# Patient Record
Sex: FEMALE | Race: WHITE | Employment: OTHER | URBAN - METROPOLITAN AREA
[De-identification: names, ages, dates, MRNs, and addresses within clinical notes are randomized per-mention and may not be internally consistent; named-entity substitution may affect disease eponyms.]

---

## 2017-09-07 ENCOUNTER — OFFICE VISIT (OUTPATIENT)
Dept: OBGYN CLINIC | Facility: CLINIC | Age: 77
End: 2017-09-07

## 2017-09-07 VITALS
BODY MASS INDEX: 25.58 KG/M2 | WEIGHT: 163 LBS | SYSTOLIC BLOOD PRESSURE: 118 MMHG | DIASTOLIC BLOOD PRESSURE: 76 MMHG | HEIGHT: 67 IN

## 2017-09-07 DIAGNOSIS — R92.2 DENSE BREAST TISSUE: Primary | ICD-10-CM

## 2017-09-07 PROBLEM — R92.30 DENSE BREAST TISSUE: Status: ACTIVE | Noted: 2017-09-07

## 2017-09-07 PROCEDURE — 99213 OFFICE O/P EST LOW 20 MIN: CPT | Performed by: OBSTETRICS & GYNECOLOGY

## 2017-09-07 NOTE — PROGRESS NOTES
Nenita Cisneros is a 78year old female. HPI:   Patient presents with:  Breast Lump: left breast, noticed it about a week ago      Here with concerns about thickened breast tissue beneath left breast that feels less prominent today.  No skin changes or a

## 2017-10-10 ENCOUNTER — OFFICE VISIT (OUTPATIENT)
Dept: OBGYN CLINIC | Facility: CLINIC | Age: 77
End: 2017-10-10

## 2017-10-10 VITALS
SYSTOLIC BLOOD PRESSURE: 116 MMHG | HEIGHT: 67 IN | DIASTOLIC BLOOD PRESSURE: 60 MMHG | WEIGHT: 163 LBS | BODY MASS INDEX: 25.58 KG/M2

## 2017-10-10 DIAGNOSIS — R92.2 DENSE BREAST TISSUE: Primary | ICD-10-CM

## 2017-10-10 PROCEDURE — 99213 OFFICE O/P EST LOW 20 MIN: CPT | Performed by: OBSTETRICS & GYNECOLOGY

## 2017-10-10 NOTE — PROGRESS NOTES
Silvina Cruz is a 78year old female. HPI:   Patient presents with:  Breast Lump: follow up breast exam      Here for follow up left breast exam. No longer feels prior lump.     Medications (Active prior to today's visit):    Current Outpatient Prescr

## 2020-03-05 ENCOUNTER — OFFICE VISIT (OUTPATIENT)
Dept: OBGYN CLINIC | Facility: CLINIC | Age: 80
End: 2020-03-05
Payer: MEDICARE

## 2020-03-05 VITALS
DIASTOLIC BLOOD PRESSURE: 74 MMHG | HEIGHT: 67 IN | BODY MASS INDEX: 25.9 KG/M2 | WEIGHT: 165 LBS | SYSTOLIC BLOOD PRESSURE: 126 MMHG

## 2020-03-05 DIAGNOSIS — R10.2 VAGINAL PAIN: Primary | ICD-10-CM

## 2020-03-05 PROCEDURE — 87529 HSV DNA AMP PROBE: CPT | Performed by: OBSTETRICS & GYNECOLOGY

## 2020-03-05 PROCEDURE — 99213 OFFICE O/P EST LOW 20 MIN: CPT | Performed by: OBSTETRICS & GYNECOLOGY

## 2020-03-05 NOTE — PROGRESS NOTES
Gita Sharma is a 68year old female. HPI:   Patient presents with: Follow - Up: EST/VAGINAL SORENESS    Noted tender red spot on inner left labia a few weeks ago with irritative symptoms. Unresponsive to OTC HC cream and A&D.  No bleeding or drainag

## 2020-03-06 LAB
HSV1 DNA SPEC QL NAA+PROBE: POSITIVE
HSV2 DNA SPEC QL NAA+PROBE: NEGATIVE

## 2020-03-10 ENCOUNTER — PATIENT MESSAGE (OUTPATIENT)
Dept: OBGYN CLINIC | Facility: CLINIC | Age: 80
End: 2020-03-10

## 2020-03-10 ENCOUNTER — TELEPHONE (OUTPATIENT)
Dept: OBGYN CLINIC | Facility: CLINIC | Age: 80
End: 2020-03-10

## 2020-03-10 NOTE — TELEPHONE ENCOUNTER
Unable to leave message to discuss test results. Called both home and cell numbers with no answer and no VM option. Will repeat attempts at phone calls. Hanna Stiles

## 2020-03-10 NOTE — TELEPHONE ENCOUNTER
Notes recorded by Bria Yañez MD on 3/10/2020 at 1:58 PM CDT  Please call my office to discuss your test results. I called both your home and cell phone numbers today with no answer and no voicemail option at either number.       Jacey Rivero MD

## 2020-03-10 NOTE — PROGRESS NOTES
Jordan Loja MD    3/10/20 1:57 PM   Note    Unable to leave message to discuss test results. Called both home and cell numbers with no answer and no VM option. Will repeat attempts at phone calls.

## 2020-03-11 ENCOUNTER — TELEPHONE (OUTPATIENT)
Dept: OBGYN CLINIC | Facility: CLINIC | Age: 80
End: 2020-03-11

## 2020-03-11 RX ORDER — VALACYCLOVIR HYDROCHLORIDE 1 G/1
1 TABLET, FILM COATED ORAL DAILY
Qty: 5 TABLET | Refills: 5 | Status: SHIPPED | OUTPATIENT
Start: 2020-03-11 | End: 2020-03-16

## 2020-03-11 NOTE — PROGRESS NOTES
Spoke with patient and reviewed HSV1 culture result and need for Rx. Will send Valtrex eRx now.  Ce Winters MD   3/11/20 1:50 PM

## 2020-04-08 ENCOUNTER — OFFICE VISIT (OUTPATIENT)
Dept: OBGYN CLINIC | Facility: CLINIC | Age: 80
End: 2020-04-08
Payer: MEDICARE

## 2020-04-08 VITALS
BODY MASS INDEX: 25.9 KG/M2 | SYSTOLIC BLOOD PRESSURE: 118 MMHG | WEIGHT: 165 LBS | DIASTOLIC BLOOD PRESSURE: 64 MMHG | HEIGHT: 67 IN

## 2020-04-08 DIAGNOSIS — A60.00 GENITAL HERPES SIMPLEX TYPE 1 INFECTION: Primary | ICD-10-CM

## 2020-04-08 PROCEDURE — 99213 OFFICE O/P EST LOW 20 MIN: CPT | Performed by: OBSTETRICS & GYNECOLOGY

## 2020-04-08 RX ORDER — VALACYCLOVIR HYDROCHLORIDE 1 G/1
TABLET, FILM COATED ORAL
COMMUNITY

## 2020-04-08 RX ORDER — TACROLIMUS 1 MG/G
OINTMENT TOPICAL
COMMUNITY

## 2020-04-08 RX ORDER — CIPROFLOXACIN 500 MG/1
TABLET, FILM COATED ORAL
COMMUNITY

## 2020-04-08 NOTE — PROGRESS NOTES
Marcha Gilford is a 68year old female. HPI:   Patient presents with: Follow - Up: on vaginal irritation     Feeling much better with resolution of vulvar irritation after completing Valtrex prescribed for HSV1 positive culture on 3/5/2020.     Michael Espinal

## 2021-02-05 ENCOUNTER — OFFICE VISIT (OUTPATIENT)
Dept: OTOLARYNGOLOGY | Facility: CLINIC | Age: 81
End: 2021-02-05
Payer: MEDICARE

## 2021-02-05 VITALS
BODY MASS INDEX: 25.9 KG/M2 | DIASTOLIC BLOOD PRESSURE: 72 MMHG | TEMPERATURE: 98 F | SYSTOLIC BLOOD PRESSURE: 128 MMHG | HEIGHT: 67 IN | WEIGHT: 165 LBS

## 2021-02-05 DIAGNOSIS — R49.0 HOARSENESS: ICD-10-CM

## 2021-02-05 DIAGNOSIS — J34.2 DEVIATED NASAL SEPTUM: Primary | ICD-10-CM

## 2021-02-05 PROCEDURE — 99203 OFFICE O/P NEW LOW 30 MIN: CPT | Performed by: OTOLARYNGOLOGY

## 2021-02-05 PROCEDURE — 31575 DIAGNOSTIC LARYNGOSCOPY: CPT | Performed by: OTOLARYNGOLOGY

## 2021-02-05 RX ORDER — FLUTICASONE PROPIONATE 50 MCG
1 SPRAY, SUSPENSION (ML) NASAL 2 TIMES DAILY
Qty: 1 BOTTLE | Refills: 3 | Status: SHIPPED | OUTPATIENT
Start: 2021-02-05

## 2021-02-05 RX ORDER — MONTELUKAST SODIUM 10 MG/1
10 TABLET ORAL NIGHTLY
Qty: 30 TABLET | Refills: 3 | Status: SHIPPED | OUTPATIENT
Start: 2021-02-05 | End: 2021-05-04

## 2021-02-05 RX ORDER — LORATADINE 10 MG/1
10 TABLET ORAL DAILY
Qty: 30 TABLET | Refills: 3 | Status: SHIPPED | OUTPATIENT
Start: 2021-02-05 | End: 2021-05-04

## 2021-02-05 NOTE — PROGRESS NOTES
Jagdish Gabriel is a 80year old female.   Patient presents with:  Throat Problem: Patient Presents with: Hoarseness for 5 weeks, sore throat, phlegm , chokes when drinking liquids       HISTORY OF PRESENT ILLNESS  She presents with a 5-week history of hoar 1971    X 3    • KNEE SURGERY Right 1983   • OTHER SURGICAL HISTORY  06/18/2008    Endometrial BX   • OTHER SURGICAL HISTORY  01/28/2009    vulva bx   • WRIST FRACTURE SURGERY Right          REVIEW OF SYSTEMS    System Neg/Pos Details   Constitutional Kiera Harris cervical. Supraclavicular. Nose/Mouth/Throat Normal External nose - Normal. Lips/teeth/gums - Normal. Tonsils - Normal. Oropharynx -postnasal discharge with erythema.    Nose/Mouth/Throat Normal Nares - Right: Normal Left: Normal. Septum -deviated to skin., Disp: , Rfl:   •  Multiple Vitamins-Minerals (MULTIVITAMIN ADULT OR), Take by mouth., Disp: , Rfl:   •  Pseudoephedrine HCl (SUDAFED OR), Take by mouth as needed. , Disp: , Rfl:   •  Levothyroxine Sodium 150 MCG Oral Tab, TK 1 T PO QD, Disp: , Rfl: 3

## 2021-03-05 ENCOUNTER — OFFICE VISIT (OUTPATIENT)
Dept: OTOLARYNGOLOGY | Facility: CLINIC | Age: 81
End: 2021-03-05
Payer: MEDICARE

## 2021-03-05 VITALS
WEIGHT: 165 LBS | TEMPERATURE: 96 F | BODY MASS INDEX: 25.9 KG/M2 | SYSTOLIC BLOOD PRESSURE: 138 MMHG | DIASTOLIC BLOOD PRESSURE: 82 MMHG | HEIGHT: 67 IN

## 2021-03-05 DIAGNOSIS — R49.0 HOARSENESS: Primary | ICD-10-CM

## 2021-03-05 DIAGNOSIS — J34.2 DEVIATED NASAL SEPTUM: ICD-10-CM

## 2021-03-05 PROCEDURE — 99213 OFFICE O/P EST LOW 20 MIN: CPT | Performed by: OTOLARYNGOLOGY

## 2021-03-05 RX ORDER — GLYCOPYRROLATE 1 MG/1
1 TABLET ORAL 3 TIMES DAILY
Qty: 90 TABLET | Refills: 1 | Status: SHIPPED | OUTPATIENT
Start: 2021-03-05

## 2021-03-05 NOTE — PROGRESS NOTES
Cristhian Blanchard is a 80year old female. Patient presents with:   Follow - Up: Patient here for 1 month follow up regarding Hoarseness, per pt feeling better       HISTORY OF PRESENT ILLNESS  She presents with a 5-week history of hoarseness that comes and ABUSE       Family History   Problem Relation Age of Onset   • Heart Attack Father    • Diabetes Father    • Other (PARKINSON) Mother        Past Medical History:   Diagnosis Date   • Breast cancer ([facility] [address] 2004   • Genital herpes simplex 03/05/2020    HSV1 c Cranial nerves II through XII grossly intact.    Head/Face Normal Facial features - Normal. Eyebrows - Normal. Skull - Normal.        Nasopharynx Normal External nose - Normal. Lips/teeth/gums - Normal. Tonsils - Normal. Oropharynx - Normal.   Ears Normal I Rfl:   •  valACYclovir HCl 1 G Oral Tab, valacyclovir 1 gram tablet, Disp: , Rfl:   •  tacrolimus 0.1 % External Ointment, tacrolimus 0.1 % topical ointment, Disp: , Rfl:   ASSESSMENT AND PLAN    1. Hoarseness    2.  Deviated nasal septum  Voice issues are

## 2021-03-20 ENCOUNTER — PATIENT MESSAGE (OUTPATIENT)
Dept: OTOLARYNGOLOGY | Facility: CLINIC | Age: 81
End: 2021-03-20

## 2021-03-22 NOTE — TELEPHONE ENCOUNTER
From: Karina Rosa  To: Sabas Angelo. Nicol eD Paz MD  Sent: 3/20/2021 12:14 PM CDT  Subject: Non-Urgent Medical Question    with the addition of the glycopyrrolate to my medicine regime, it seems to dry up and harden the mucous in my nose and throat.  Waking up th

## 2021-05-04 RX ORDER — MONTELUKAST SODIUM 10 MG/1
TABLET ORAL
Qty: 30 TABLET | Refills: 3 | Status: SHIPPED | OUTPATIENT
Start: 2021-05-04

## 2021-05-04 RX ORDER — LORATADINE 10 MG/1
TABLET ORAL
Qty: 90 TABLET | Refills: 0 | Status: SHIPPED | OUTPATIENT
Start: 2021-05-04

## 2021-05-07 ENCOUNTER — OFFICE VISIT (OUTPATIENT)
Dept: OTOLARYNGOLOGY | Facility: CLINIC | Age: 81
End: 2021-05-07
Payer: MEDICARE

## 2021-05-07 VITALS
BODY MASS INDEX: 25.9 KG/M2 | WEIGHT: 165 LBS | TEMPERATURE: 99 F | SYSTOLIC BLOOD PRESSURE: 124 MMHG | DIASTOLIC BLOOD PRESSURE: 80 MMHG | HEIGHT: 67 IN

## 2021-05-07 DIAGNOSIS — R49.0 HOARSENESS: Primary | ICD-10-CM

## 2021-05-07 DIAGNOSIS — J34.2 DEVIATED NASAL SEPTUM: ICD-10-CM

## 2021-05-07 PROCEDURE — 31575 DIAGNOSTIC LARYNGOSCOPY: CPT | Performed by: OTOLARYNGOLOGY

## 2021-05-07 PROCEDURE — 99213 OFFICE O/P EST LOW 20 MIN: CPT | Performed by: OTOLARYNGOLOGY

## 2021-05-07 NOTE — PROGRESS NOTES
Gina Millard is a 80year old female. No chief complaint on file.       HISTORY OF PRESENT ILLNESS  She presents with a 5-week history of hoarseness that comes and goes. Freeman she actually feels pretty good.  She does have a more recent sore throat at back to 2 tablets a day.     Social History    Socioeconomic History      Marital status:       Spouse name: Not on file      Number of children: Not on file      Years of education: Not on file      Highest education level: Not on file    Tobacco Us Normal.   Psychiatric Normal Orientation - Oriented to time, place, person & situation. Appropriate mood and affect.    Neck Exam Normal Inspection - Normal. Palpation - Normal. Parotid gland - Normal. Thyroid gland - Normal.   Eyes Normal Conjunctiva - Rig HCl (SUDAFED OR), Take by mouth as needed. , Disp: , Rfl:   •  Levothyroxine Sodium 150 MCG Oral Tab, TK 1 T PO QD, Disp: , Rfl: 3  •  levETIRAcetam 500 MG Oral Tab, , Disp: , Rfl:   •  Glucosamine-Chondroit-Vit C-Mn (GLUCOSAMINE 1500 COMPLEX OR), Take by m

## 2021-06-06 ENCOUNTER — HOSPITAL ENCOUNTER (OUTPATIENT)
Age: 81
Discharge: HOME | End: 2021-06-06
Payer: MEDICARE

## 2021-06-06 VITALS
OXYGEN SATURATION: 99 % | SYSTOLIC BLOOD PRESSURE: 123 MMHG | DIASTOLIC BLOOD PRESSURE: 76 MMHG | HEART RATE: 81 BPM | RESPIRATION RATE: 18 BRPM | WEIGHT: 165 LBS | HEIGHT: 67 IN | TEMPERATURE: 97 F | BODY MASS INDEX: 25.9 KG/M2

## 2021-06-06 DIAGNOSIS — N30.00 ACUTE CYSTITIS WITHOUT HEMATURIA: Primary | ICD-10-CM

## 2021-06-06 DIAGNOSIS — R35.0 URINARY FREQUENCY: ICD-10-CM

## 2021-06-06 PROCEDURE — 99203 OFFICE O/P NEW LOW 30 MIN: CPT | Performed by: EMERGENCY MEDICINE

## 2021-06-06 PROCEDURE — 81002 URINALYSIS NONAUTO W/O SCOPE: CPT | Performed by: EMERGENCY MEDICINE

## 2021-06-06 RX ORDER — CEPHALEXIN 500 MG/1
500 CAPSULE ORAL 2 TIMES DAILY
Qty: 14 CAPSULE | Refills: 0 | Status: SHIPPED | OUTPATIENT
Start: 2021-06-06 | End: 2021-06-13

## 2021-06-06 NOTE — ED PROVIDER NOTES
Patient Seen in: [facility]      History   Patient presents with:  Urinary Symptoms    Stated Complaint: Urinary issue    HPI/Subjective:   Kamari Monroy is a 80year old female presents for c/o Uti sx for 5 days. Pain right now is 0/10.  C °F (36.1 °C) (Tympanic)   Resp 18   Ht 170.2 cm (5' 7\")   Wt 74.8 kg   SpO2 99%   BMI 25.84 kg/m²         Physical Exam  Vitals and nursing note reviewed. Constitutional:       Appearance: Normal appearance. She is not ill-appearing.    HENT:      Head: 01/21/2019 0.7  no hx of bonita, or ckd. MDM     Urine dip discussed with pt; leuks and blood. Culture sent as discussed. Pain control. Start abx. We discussed hsv1 flare, renal stones vs pyelonephritis vs cystitis. No fever chills body aches or back pain.

## 2021-11-17 ENCOUNTER — HOSPITAL ENCOUNTER (OUTPATIENT)
Age: 81
Discharge: HOME | End: 2021-11-17
Payer: MEDICARE

## 2021-11-17 VITALS
BODY MASS INDEX: 26.68 KG/M2 | OXYGEN SATURATION: 100 % | SYSTOLIC BLOOD PRESSURE: 130 MMHG | WEIGHT: 170 LBS | RESPIRATION RATE: 16 BRPM | DIASTOLIC BLOOD PRESSURE: 94 MMHG | HEART RATE: 80 BPM | HEIGHT: 67 IN | TEMPERATURE: 98 F

## 2021-11-17 DIAGNOSIS — N30.90 CYSTITIS: Primary | ICD-10-CM

## 2021-11-17 PROCEDURE — 99213 OFFICE O/P EST LOW 20 MIN: CPT | Performed by: NURSE PRACTITIONER

## 2021-11-17 PROCEDURE — 81002 URINALYSIS NONAUTO W/O SCOPE: CPT | Performed by: NURSE PRACTITIONER

## 2021-11-17 RX ORDER — CEPHALEXIN 500 MG/1
500 CAPSULE ORAL 2 TIMES DAILY
Qty: 14 CAPSULE | Refills: 0 | Status: SHIPPED | OUTPATIENT
Start: 2021-11-17 | End: 2021-11-24

## 2021-11-17 NOTE — ED PROVIDER NOTES
Patient presents with:  Urinary Symptoms      HPI:     Oksana Reaves is a 80year old female who presents with a chief complaint of foul-smelling urine. This has been going on for the past couple days.   The patient states when this occurs, she usually h Narrative      NO HISTORY OF ABUSE     Social Determinants of Health  Financial Resource Strain: Not on file  Food Insecurity: Not on file  Transportation Needs: Not on file  Physical Activity: Not on file  Stress: Not on file  Social Connections: Not on f Negative mg/dL    Bilirubin, Urine Negative Negative    Blood, Urine Trace-lysed (A) Negative    Nitrite Urine Negative Negative    Urobilinogen urine <2.0 <2.0 mg/dL    Leukocyte esterase urine Large (A) Negative       MDM:   The urine dip shows large lior

## 2021-11-19 ENCOUNTER — HOSPITAL ENCOUNTER (OUTPATIENT)
Age: 81
Discharge: HOME | End: 2021-11-19
Payer: MEDICARE

## 2021-11-19 VITALS
DIASTOLIC BLOOD PRESSURE: 82 MMHG | HEART RATE: 83 BPM | OXYGEN SATURATION: 98 % | TEMPERATURE: 98 F | RESPIRATION RATE: 16 BRPM | SYSTOLIC BLOOD PRESSURE: 136 MMHG

## 2021-11-19 DIAGNOSIS — N30.90 CYSTITIS: Primary | ICD-10-CM

## 2021-11-19 PROCEDURE — 99213 OFFICE O/P EST LOW 20 MIN: CPT | Performed by: NURSE PRACTITIONER

## 2021-11-19 RX ORDER — CIPROFLOXACIN 500 MG/1
500 TABLET, FILM COATED ORAL 2 TIMES DAILY
Qty: 14 TABLET | Refills: 0 | Status: SHIPPED | OUTPATIENT
Start: 2021-11-19 | End: 2021-11-26

## 2021-11-19 NOTE — ED PROVIDER NOTES
Patient Seen in: [facility]      History   Patient presents with:  Urinary Symptoms    Stated Complaint: Urinary symptoms    Subjective:   HPI    This is a 17-year-old female presenting with urinary symptoms.   Patient states that she was her Nose: Nose normal.      Mouth/Throat:      Mouth: Mucous membranes are moist.      Pharynx: Oropharynx is clear. Eyes:      Conjunctiva/sclera: Conjunctivae normal.   Cardiovascular:      Rate and Rhythm: Normal rate.       Heart sounds: Normal heart charbel worsening symptoms or if she feels her symptoms have not improved. Patient made aware she will receive a phone call with urine culture results if by chance the urine culture showed some type of resistance to Cipro. Discussed close follow-up with her PCP.

## 2021-11-19 NOTE — ED NOTES
Pt aware of results came in today as patient, reports continues with discomfort and wants antibiotic changed.

## 2023-01-24 ENCOUNTER — HOSPITAL ENCOUNTER (OUTPATIENT)
Age: 83
Discharge: HOME | End: 2023-01-24
Payer: MEDICARE

## 2023-01-24 VITALS
DIASTOLIC BLOOD PRESSURE: 76 MMHG | HEART RATE: 68 BPM | TEMPERATURE: 98 F | RESPIRATION RATE: 17 BRPM | OXYGEN SATURATION: 100 % | SYSTOLIC BLOOD PRESSURE: 176 MMHG

## 2023-01-24 DIAGNOSIS — S91.311A LACERATION OF RIGHT FOOT, INITIAL ENCOUNTER: Primary | ICD-10-CM

## 2023-01-24 PROCEDURE — 99213 OFFICE O/P EST LOW 20 MIN: CPT | Performed by: EMERGENCY MEDICINE

## 2023-01-24 RX ORDER — CEPHALEXIN 500 MG/1
500 CAPSULE ORAL 2 TIMES DAILY
Qty: 14 CAPSULE | Refills: 0 | Status: SHIPPED | OUTPATIENT
Start: 2023-01-24 | End: 2023-01-31

## 2023-01-24 NOTE — DISCHARGE INSTRUCTIONS
Go through all of your paper work for treatment, and when to go to the ER. It is not appropriate to follow up for wound check in the urgent care. If you feel you need a follow up, call your primary and let them know you were seen at the urgent care today to see if/when they need to see you. If you do not have a primary provider, go to [email or web address] org to find one  Wounds take 10-14 days to heal. Some may take longer. Wound care:  Wash hand before and after dressing changes. Change dressing every 12 hours starting tomorrow unless visibly soiled. Do not shower with the dressing. This can be taken off and placed back on. Do not pull at the dressing if it is stuck. Apply a wet warm (not hot) compress to the area to help soak the dressing off. Pulling can cause bleeding and trauma to the skin. 1. Xeroform = Yellow antibiotic gauze. This can be found on [facility]. Other name: 3% bismuth-tribromphenate in a petrolatum blend  2. Telfa = white guaze. Nonadherent. This is the inside of a Band-Aid. 3. Coban = Brown self adhesive wrap. Does not stick to skin. Always wrap toward you heart. For example: if you wrap your leg start toward the foot and wrap up toward the ankle. Watch for streaking. Take Tylenol as needed for pain if there is no contraindication. Go to the ER for new or worsening symptoms as described on you After Visit Summary (AVS).

## 2023-01-24 NOTE — ED INITIAL ASSESSMENT (HPI)
Pt came in due to laceration on the right foot. Pt stated a week ago she was coming out of the shower and she scraped her right foot. Pt is UTD with tetanus vaccine. Pt is not actively bleeding. Pt has easy non labored respirations.

## 2023-07-20 ENCOUNTER — HOSPITAL ENCOUNTER (OUTPATIENT)
Age: 83
Discharge: HOME | End: 2023-07-20
Payer: MEDICARE

## 2023-07-20 VITALS
TEMPERATURE: 97 F | SYSTOLIC BLOOD PRESSURE: 167 MMHG | RESPIRATION RATE: 18 BRPM | OXYGEN SATURATION: 96 % | HEART RATE: 72 BPM | DIASTOLIC BLOOD PRESSURE: 86 MMHG

## 2023-07-20 DIAGNOSIS — S00.06XA TICK BITE OF SCALP, INITIAL ENCOUNTER: Primary | ICD-10-CM
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
## 2023-07-20 PROCEDURE — 99213 OFFICE O/P EST LOW 20 MIN: CPT | Performed by: NURSE PRACTITIONER

## 2023-07-20 RX ORDER — DOXYCYCLINE HYCLATE 100 MG/1
200 CAPSULE ORAL ONCE
Qty: 2 CAPSULE | Refills: 0 | Status: SHIPPED | OUTPATIENT
Start: 2023-07-20 | End: 2023-07-20

## 2023-07-20 NOTE — ED INITIAL ASSESSMENT (HPI)
Pt hiking on Sunday and found a tick on her scalp today; pt brought tick with her for confirmation; denies current symptoms

## 2023-10-14 ENCOUNTER — HOSPITAL ENCOUNTER (OUTPATIENT)
Age: 83
Discharge: HOME | End: 2023-10-14
Payer: MEDICARE

## 2023-10-14 VITALS
DIASTOLIC BLOOD PRESSURE: 70 MMHG | HEART RATE: 77 BPM | TEMPERATURE: 99 F | OXYGEN SATURATION: 100 % | RESPIRATION RATE: 20 BRPM | SYSTOLIC BLOOD PRESSURE: 161 MMHG

## 2023-10-14 DIAGNOSIS — J06.9 VIRAL UPPER RESPIRATORY TRACT INFECTION WITH COUGH: ICD-10-CM

## 2023-10-14 DIAGNOSIS — J02.9 ACUTE VIRAL PHARYNGITIS: Primary | ICD-10-CM

## 2023-10-14 DIAGNOSIS — J30.89 ENVIRONMENTAL AND SEASONAL ALLERGIES: ICD-10-CM

## 2023-10-14 LAB — S PYO AG THROAT QL: NEGATIVE

## 2023-10-14 RX ORDER — DEXAMETHASONE 4 MG/1
8 TABLET ORAL ONCE
Status: COMPLETED | OUTPATIENT
Start: 2023-10-14 | End: 2023-10-14

## 2023-10-14 RX ORDER — BENZOCAINE/MENTH/CETYLPYRD CL 15 MG-2 MG
1 LOZENGE MUCOUS MEMBRANE EVERY 6 HOURS PRN
Qty: 20 LOZENGE | Refills: 0 | Status: SHIPPED | OUTPATIENT
Start: 2023-10-14

## 2023-10-14 RX ORDER — ROSUVASTATIN CALCIUM 40 MG/1
40 TABLET, COATED ORAL DAILY
COMMUNITY

## 2023-10-14 NOTE — ED INITIAL ASSESSMENT (HPI)
Pt here with complaints of sore throat , cough and congestion that began 1 week ago, pt denies any fevers or sob, pt states she has been testing negative for Covid at home

## 2023-12-03 ENCOUNTER — HOSPITAL ENCOUNTER (OUTPATIENT)
Age: 83
Discharge: HOME | End: 2023-12-03
Payer: MEDICARE

## 2023-12-03 VITALS
TEMPERATURE: 97 F | OXYGEN SATURATION: 99 % | RESPIRATION RATE: 18 BRPM | SYSTOLIC BLOOD PRESSURE: 144 MMHG | HEART RATE: 76 BPM | DIASTOLIC BLOOD PRESSURE: 75 MMHG

## 2023-12-03 DIAGNOSIS — N30.00 ACUTE CYSTITIS WITHOUT HEMATURIA: Primary | ICD-10-CM

## 2023-12-03 LAB
BILIRUB UR QL STRIP: NEGATIVE
COLOR UR: YELLOW
GLUCOSE UR STRIP-MCNC: NEGATIVE MG/DL
KETONES UR STRIP-MCNC: NEGATIVE MG/DL
NITRITE UR QL STRIP: POSITIVE
PH UR STRIP: 6 [PH]
PROT UR STRIP-MCNC: NEGATIVE MG/DL
SP GR UR STRIP: 1.01
UROBILINOGEN UR STRIP-ACNC: <2 MG/DL

## 2023-12-03 PROCEDURE — 81002 URINALYSIS NONAUTO W/O SCOPE: CPT | Performed by: NURSE PRACTITIONER

## 2023-12-03 PROCEDURE — 99213 OFFICE O/P EST LOW 20 MIN: CPT | Performed by: NURSE PRACTITIONER

## 2023-12-03 RX ORDER — CEPHALEXIN 500 MG/1
500 CAPSULE ORAL EVERY 12 HOURS
Qty: 14 CAPSULE | Refills: 0 | Status: SHIPPED | OUTPATIENT
Start: 2023-12-03 | End: 2023-12-10

## 2024-08-07 ENCOUNTER — HOSPITAL ENCOUNTER (OUTPATIENT)
Age: 84
Discharge: ANOTHER HOSPITAL | End: 2024-08-07
Payer: MEDICARE

## 2024-08-07 VITALS
RESPIRATION RATE: 20 BRPM | TEMPERATURE: 98 F | SYSTOLIC BLOOD PRESSURE: 131 MMHG | HEART RATE: 90 BPM | DIASTOLIC BLOOD PRESSURE: 80 MMHG | OXYGEN SATURATION: 100 %

## 2024-08-07 DIAGNOSIS — I48.91 ATRIAL FIBRILLATION WITH RAPID VENTRICULAR RESPONSE (HCC): Primary | ICD-10-CM

## 2024-08-07 LAB
Q-T INTERVAL: 326 MS
QRS DURATION: 90 MS
QTC CALCULATION (BEZET): 481 MS
R AXIS: -16 DEGREES
T AXIS: 123 DEGREES
VENTRICULAR RATE: 131 BPM

## 2024-08-07 PROCEDURE — 99215 OFFICE O/P EST HI 40 MIN: CPT | Performed by: NURSE PRACTITIONER

## 2024-08-07 PROCEDURE — 93000 ELECTROCARDIOGRAM COMPLETE: CPT | Performed by: NURSE PRACTITIONER

## 2024-08-07 RX ORDER — LOSARTAN POTASSIUM 100 MG/1
100 TABLET ORAL DAILY
COMMUNITY
Start: 2024-07-23

## 2024-08-07 RX ORDER — ACETAMINOPHEN 500 MG
500 TABLET ORAL
COMMUNITY
Start: 2024-04-24

## 2024-08-07 RX ORDER — LEVOTHYROXINE SODIUM 0.12 MG/1
TABLET ORAL
COMMUNITY
Start: 2024-07-03

## 2024-08-07 RX ORDER — ATORVASTATIN CALCIUM 80 MG/1
80 TABLET, FILM COATED ORAL NIGHTLY
COMMUNITY
Start: 2024-05-20

## 2024-08-07 RX ORDER — METOPROLOL SUCCINATE 50 MG/1
50 TABLET, EXTENDED RELEASE ORAL DAILY
COMMUNITY
Start: 2024-07-23

## 2024-08-07 NOTE — ED QUICK NOTES
EMS called at 0911 for transport to [facility] ED for afib with RVR.  EMS arrived 916.

## 2024-08-07 NOTE — ED PROVIDER NOTES
Patient Seen in: [facility]      History     Chief Complaint   Patient presents with    Lightheadedness     Stated Complaint: HIGH BLOOD PRESSURE    Subjective:   HPI  Patient is an 84-year-old female with high cholesterol and seizure disorder.  She presents to immediate care center today with concern for lightheaded, dizziness, weakness.  This started this morning.  She walked from her residence to her doctor's office.  They were without electricity and deferred her here (she walked between buildings).  She remains lightheaded and dizzy at time of evaluation.  She has had no chest pain or shortness of air.  She denies recent illness or injury.      She has a history of atrial fibrillation and was admitted to the hospital  to  with A-fib and rapid ventricular rate.  During that hospitalization she was found to have 90% occlusion of the LAD.  PCI was performed.  Postprocedure she had confusion.  MRI showed multiple foci of ischemia.         Objective:   Past Medical History:    Breast cancer (HCC)    Genital herpes simplex    HSV1 culture positive    Hypothyroidism    Seizure disorder (HCC)              Past Surgical History:   Procedure Laterality Date    Ankle fracture surgery Left 2016    Breast lumpectomy Left       1965 1969 1971    X 3     Knee surgery Right 1983    Other surgical history  2008    Endometrial BX    Other surgical history  2009    vulva bx    Wrist fracture surgery Right                 Social History     Socioeconomic History    Marital status:    Tobacco Use    Smoking status: Never    Smokeless tobacco: Never   Substance and Sexual Activity    Alcohol use: Yes     Alcohol/week: 0.0 standard drinks of alcohol     Comment: OCC WINE    Drug use: No    Sexual activity: Yes   Other Topics Concern    Blood Transfusions Yes     Comment: 1965    Social History Narrative    NO HISTORY OF ABUSE      Social Determinants of Health      Financial Resource Strain: Low Risk  (5/23/2024)    Received from [facility]    Overall Financial Resource Strain (CARDIA)     Difficulty of Paying Living Expenses: Not hard at all   Food Insecurity: No Food Insecurity (5/23/2024)    Received from [facility]    Hunger Vital Sign     Worried About Running Out of Food in the Last Year: Never true     Ran Out of Food in the Last Year: Never true   Transportation Needs: No Transportation Needs (5/23/2024)    Received from [facility]    PRAPARE - Transportation     Lack of Transportation (Medical): No     Lack of Transportation (Non-Medical): No   Housing Stability: Low Risk  (5/23/2024)    Received from [facility]    Housing Stability Vital Sign     Unable to Pay for Housing in the Last Year: No     Number of Places Lived in the Last Year: 1     In the last 12 months, was there a time when you did not have a steady place to sleep or slept in a shelter (including now)?: No              Review of Systems   Constitutional:  Negative for fever.   HENT:  Negative for congestion.    Respiratory:  Negative for cough and shortness of breath.    Cardiovascular:  Negative for chest pain.   Gastrointestinal:  Negative for abdominal pain.   Neurological:  Positive for dizziness, weakness and light-headedness. Negative for headaches.       Positive for stated Chief Complaint: Lightheadedness    Other systems are as noted in HPI.  Constitutional and vital signs reviewed.      All other systems reviewed and negative except as noted above.    Physical Exam     ED Triage Vitals [08/07/24 0848]   /80   Pulse 90   Resp 20   Temp 97.6 °F (36.4 °C)   Temp src Temporal   SpO2 100 %   O2 Device        Current Vitals:   Vital Signs  BP: 131/80  Pulse: 90  Resp: 20  Temp: 97.6 °F (36.4 °C)  Temp src: Temporal    Oxygen Therapy  SpO2: 100 %            Physical Exam  Vitals and nursing note reviewed.    Constitutional:       General: She is not in acute distress.     Appearance: She is ill-appearing.   HENT:      Head: Normocephalic and atraumatic.   Cardiovascular:      Rate and Rhythm: Tachycardia present. Rhythm irregular.   Pulmonary:      Effort: Pulmonary effort is normal. No respiratory distress.   Skin:     General: Skin is warm and dry.   Neurological:      Mental Status: She is alert and oriented to person, place, and time.   Psychiatric:         Behavior: Behavior normal.               ED Course   Labs Reviewed - No data to display  EKG    Rate, intervals and axes as noted on EKG Report.  Rate: 131  Rhythm: Atrial Fibrillation  Reading: septal infarct, age undetermined; NO STEMI                          MDM      Patient was found to be in atrial fibrillation with rapid ventricular response promptly after presentation here.  We had immediate conversation that she will need resources for evaluation and treatment unavailable in the immediate care center.  She was informed that it is imperative that she go directly from here to the emergency department for further evaluation.  Patient agreed.  As patient is symptomatic with her A-fib with RVR, EMS transportation was recommended.  Initially, patient declined.  Patient's  was brought to the bedside.  We had conversation about patient's current condition and risk for deterioration as her intention was to walk home from here (several blocks) and then be driven by private vehicle to the emergency department.   is uncomfortable with this.  They had a conversation.  Patient agrees to EMS transportation.      EMS arrived and transported patient to the emergency department in stable condition.                                 Medical Decision Making  Differential diagnoses considered today include, but are not exclusive of: Atrial fibrillation; acute coronary syndrome; acute cerebrovascular accident; TIA    Problems Addressed:  Atrial fibrillation with  rapid ventricular response (HCC): undiagnosed new problem with uncertain prognosis    Amount and/or Complexity of Data Reviewed  ECG/medicine tests: independent interpretation performed. Decision-making details documented in ED Course.        Disposition and Plan     Clinical Impression:  1. Atrial fibrillation with rapid ventricular response (HCC)         Disposition:  Ic to ed  8/7/2024  9:12 am    Follow-up:  No follow-up provider specified.        Medications Prescribed:  Current Discharge Medication List

## 2024-12-09 ENCOUNTER — HOSPITAL ENCOUNTER (OUTPATIENT)
Age: 84
Discharge: HOME | End: 2024-12-09
Payer: MEDICARE

## 2024-12-09 VITALS
TEMPERATURE: 98 F | OXYGEN SATURATION: 97 % | RESPIRATION RATE: 18 BRPM | SYSTOLIC BLOOD PRESSURE: 150 MMHG | DIASTOLIC BLOOD PRESSURE: 81 MMHG | HEART RATE: 96 BPM

## 2024-12-09 DIAGNOSIS — N30.00 ACUTE CYSTITIS WITHOUT HEMATURIA: Primary | ICD-10-CM

## 2024-12-09 LAB
BILIRUB UR QL STRIP: NEGATIVE
COLOR UR: YELLOW
GLUCOSE UR STRIP-MCNC: NEGATIVE MG/DL
KETONES UR STRIP-MCNC: NEGATIVE MG/DL
NITRITE UR QL STRIP: POSITIVE
PH UR STRIP: 7 [PH]
SP GR UR STRIP: 1.01
UROBILINOGEN UR STRIP-ACNC: <2 MG/DL

## 2024-12-09 PROCEDURE — 81002 URINALYSIS NONAUTO W/O SCOPE: CPT | Performed by: EMERGENCY MEDICINE

## 2024-12-09 PROCEDURE — 99214 OFFICE O/P EST MOD 30 MIN: CPT | Performed by: EMERGENCY MEDICINE

## 2024-12-09 RX ORDER — CEPHALEXIN 500 MG/1
500 CAPSULE ORAL 2 TIMES DAILY
Qty: 14 CAPSULE | Refills: 0 | Status: SHIPPED | OUTPATIENT
Start: 2024-12-09 | End: 2024-12-16

## 2024-12-09 NOTE — ED PROVIDER NOTES
Patient Seen in: [facility]      History     Chief Complaint   Patient presents with    Urinary Symptoms     Stated Complaint: BLADDER INFECTION    Subjective:   HPI    Teresita Porter is a 84 year old female here for uti sx for one day. No fever, back pain. Recent GI illness last week. Sx are better.       Objective:     Past Medical History:    Breast cancer (HCC)    Genital herpes simplex    HSV1 culture positive    Hypothyroidism    Seizure disorder (HCC)              Past Surgical History:   Procedure Laterality Date    Ankle fracture surgery Left 2016    Breast lumpectomy Left       1965 1969 1971    X 3     Knee surgery Right 1983    Other surgical history  2008    Endometrial BX    Other surgical history  2009    vulva bx    Wrist fracture surgery Right                 Social History     Socioeconomic History    Marital status:    Tobacco Use    Smoking status: Never    Smokeless tobacco: Never   Substance and Sexual Activity    Alcohol use: Yes     Alcohol/week: 0.0 standard drinks of alcohol     Comment: OCC WINE    Drug use: No    Sexual activity: Yes   Other Topics Concern    Blood Transfusions Yes     Comment: 1965    Social History Narrative    NO HISTORY OF ABUSE      Social Drivers of Health     Financial Resource Strain: Low Risk  (2024)    Received from [facility]    Overall Financial Resource Strain (CARDIA)     Difficulty of Paying Living Expenses: Not hard at all   Food Insecurity: No Food Insecurity (2024)    Received from [facility]    Hunger Vital Sign     Worried About Running Out of Food in the Last Year: Never true     Ran Out of Food in the Last Year: Never true   Transportation Needs: No Transportation Needs (2024)    Received from [facility]    PRAPARE - Transportation     Lack of Transportation (Medical): No     Lack of Transportation  (Non-Medical): No   Housing Stability: Low Risk  (5/23/2024)    Received from [facility]    Housing Stability Vital Sign     Unable to Pay for Housing in the Last Year: No     Number of Places Lived in the Last Year: 1     Unstable Housing in the Last Year: No              Review of Systems    Positive for stated complaint: BLADDER INFECTION  Other systems are as noted in HPI.  Constitutional and vital signs reviewed.      All other systems reviewed and negative except as noted above.    Physical Exam     ED Triage Vitals [12/09/24 0937]   /81   Pulse 96   Resp 18   Temp 98 °F (36.7 °C)   Temp src Oral   SpO2 97 %   O2 Device None (Room air)       Current Vitals:   Vital Signs  BP: 150/81 (checked twice)  Pulse: 96  Resp: 18  Temp: 98 °F (36.7 °C)  Temp src: Oral    Oxygen Therapy  SpO2: 97 %  O2 Device: None (Room air)        Physical Exam  Vitals and nursing note reviewed.   Constitutional:       Appearance: Normal appearance. She is not ill-appearing.   HENT:      Head: Normocephalic.   Eyes:      Conjunctiva/sclera: Conjunctivae normal.      Pupils: Pupils are equal, round, and reactive to light.   Cardiovascular:      Rate and Rhythm: Normal rate.      Pulses: Normal pulses.   Pulmonary:      Effort: Pulmonary effort is normal. No respiratory distress.      Breath sounds: Normal breath sounds.   Abdominal:      General: Abdomen is flat. There is no distension.      Palpations: Abdomen is soft.      Tenderness: There is no abdominal tenderness. There is no right CVA tenderness, left CVA tenderness or guarding.   Skin:     Findings: No erythema or rash.   Neurological:      General: No focal deficit present.      Mental Status: She is alert and oriented to person, place, and time.   Psychiatric:         Mood and Affect: Mood normal.         Behavior: Behavior normal.         Thought Content: Thought content normal.         Judgment: Judgment normal.             ED Course     Labs  Reviewed   [facility] POCT URINALYSIS DIPSTICK - Abnormal; Notable for the following components:       Result Value    Urine Clarity Cloudy (*)     Protein urine Trace (*)     Blood, Urine Small (*)     Nitrite Urine Positive (*)     Leukocyte esterase urine Large (*)     All other components within normal limits   URINE CULTURE, ROUTINE                   MDM           Medical Decision Making  cystitis vs OAB vs renal stone vs sti vs metabolic vs somatic.    Empiric tx for UTI. Abx sent to pharmacy to take as directed.  Patient is aware that antibiotic would not treat symptoms it will treat the bacterial infection.    Urine dip discussed with pt; we will call with culture results. OTC Pain control as needed. No fever chills body aches or back pain. No N/V/D.     All questions answered and reinforced ER precautions.  Primary care follow-up as needed.  No acute distress and cleared for discharge          Problems Addressed:  Acute cystitis without hematuria: acute illness or injury    Amount and/or Complexity of Data Reviewed  External Data Reviewed: notes.  Labs: ordered. Decision-making details documented in ED Course.     Details: Independant interpretation, and reviewed with patient. No recent urine culture.     Risk  OTC drugs.  Prescription drug management.        Disposition and Plan     Clinical Impression:  1. Acute cystitis without hematuria         Disposition:  Discharge  12/9/2024 10:12 am    Follow-up:  Nicol Melo MD  [address] [place] [id number] [phone]                Medications Prescribed:  Discharge Medication List as of 12/9/2024 10:16 AM        START taking these medications    Details   cephALEXin (KEFLEX) 500 MG Oral Cap Take 1 capsule (500 mg total) by mouth 2 (two) times daily for 7 days., Normal, Disp-14 capsule, [id number]. Collaborating MD Romana Mckay.                 Supplementary Documentation:

## 2024-12-09 NOTE — ED INITIAL ASSESSMENT (HPI)
Pt states having symptoms yesterday, pt states having some increase in urgency and frequency and urine having an odor.

## 2024-12-26 ENCOUNTER — HOSPITAL ENCOUNTER (OUTPATIENT)
Age: 84
Discharge: HOME | End: 2024-12-26
Payer: MEDICARE

## 2024-12-26 VITALS
TEMPERATURE: 98 F | RESPIRATION RATE: 16 BRPM | SYSTOLIC BLOOD PRESSURE: 133 MMHG | HEART RATE: 71 BPM | OXYGEN SATURATION: 98 % | DIASTOLIC BLOOD PRESSURE: 68 MMHG

## 2024-12-26 DIAGNOSIS — N39.0 ACUTE URINARY TRACT INFECTION: Primary | ICD-10-CM

## 2024-12-26 LAB
BILIRUB UR QL STRIP: NEGATIVE
COLOR UR: YELLOW
GLUCOSE UR STRIP-MCNC: NEGATIVE MG/DL
KETONES UR STRIP-MCNC: NEGATIVE MG/DL
NITRITE UR QL STRIP: POSITIVE
PH UR STRIP: 7 [PH]
PROT UR STRIP-MCNC: 100 MG/DL
SP GR UR STRIP: 1.02
UROBILINOGEN UR STRIP-ACNC: <2 MG/DL

## 2024-12-26 PROCEDURE — 99214 OFFICE O/P EST MOD 30 MIN: CPT | Performed by: PHYSICIAN ASSISTANT

## 2024-12-26 PROCEDURE — 81002 URINALYSIS NONAUTO W/O SCOPE: CPT | Performed by: PHYSICIAN ASSISTANT

## 2024-12-26 RX ORDER — CEFADROXIL 500 MG/1
500 CAPSULE ORAL 2 TIMES DAILY
Qty: 14 CAPSULE | Refills: 0 | Status: SHIPPED | OUTPATIENT
Start: 2024-12-26 | End: 2025-01-02

## 2024-12-26 NOTE — ED PROVIDER NOTES
Chief Complaint   Patient presents with    Urinary Symptoms       History obtained from: patient   services not used     HPI:     Teresita Porter is a 84 year old female who presents with UTI symptoms x 2 days. Patient reports urinary frequency and urgency, and malodorous urine.  Patient states she has been treated for UTIs twice in the past month.  Patient was initially seen in the IC on 12/9 and diagnosed with UTI and treated with cephalexin which was susceptible on urine culture.  Patient states her symptoms improved and then returned and she was seen by her PCP on 12/15.  Again patient was diagnosed with a UTI and treated with nitrofurantoin which was susceptible on urine culture.  Patient states her symptoms resolved and returned 2 days ago.  Patient has an appointment with her PCP for recurrent UTI symptoms next week.  Patient states she is otherwise feeling well and is drinking plenty of fluids.  Denies fever, chills, abdominal pain, flank pain, nausea, vomiting, hematuria.    PMH  Past Medical History:    Breast cancer (HCC)    Genital herpes simplex    HSV1 culture positive    Hypothyroidism    Seizure disorder (HCC)       PFSH    PFSH asessment screens reviewed and agree.  Nurses notes reviewed I agree with documentation.    Family History   Problem Relation Age of Onset    Heart Attack Father     Diabetes Father     Other (PARKINSON) Mother      Family history reviewed with patient/caregiver and is not pertinent to presenting problem.  Social History     Socioeconomic History    Marital status:      Spouse name: Not on file    Number of children: Not on file    Years of education: Not on file    Highest education level: Not on file   Occupational History    Not on file   Tobacco Use    Smoking status: Never    Smokeless tobacco: Never   Substance and Sexual Activity    Alcohol use: Yes     Alcohol/week: 0.0 standard drinks of alcohol     Comment: OCC WINE    Drug use: No    Sexual  activity: Yes   Other Topics Concern     Service Not Asked    Blood Transfusions Yes     Comment: 1963     Caffeine Concern Not Asked    Occupational Exposure Not Asked    Hobby Hazards Not Asked    Sleep Concern Not Asked    Stress Concern Not Asked    Weight Concern Not Asked    Special Diet Not Asked    Back Care Not Asked    Exercise Not Asked    Bike Helmet Not Asked    Seat Belt Not Asked    Self-Exams Not Asked   Social History Narrative    NO HISTORY OF ABUSE      Social Drivers of Health     Financial Resource Strain: Low Risk  (2024)    Received from [facility]    Overall Financial Resource Strain (CARDIA)     Difficulty of Paying Living Expenses: Not hard at all   Food Insecurity: No Food Insecurity (2024)    Received from [facility]    Hunger Vital Sign     Worried About Running Out of Food in the Last Year: Never true     Ran Out of Food in the Last Year: Never true   Transportation Needs: No Transportation Needs (2024)    Received from [facility]    PRAPARE - Transportation     Lack of Transportation (Medical): No     Lack of Transportation (Non-Medical): No   Physical Activity: Not on file   Stress: Not on file   Social Connections: Not on file   Housing Stability: Low Risk  (2024)    Received from [facility]    Housing Stability Vital Sign     Unable to Pay for Housing in the Last Year: No     Number of Places Lived in the Last Year: 1     Unstable Housing in the Last Year: No         ROS:   Positive for stated complaint: Urinary frequency and urgency, malodorous urine  All other systems reviewed and negative except as noted above.  Constitutional and Vital Signs Reviewed.    Physical Exam:     Findings:    /68   Pulse 71   Temp 98 °F (36.7 °C) (Oral)   Resp 16   SpO2 98%   GENERAL: well developed, no acute distress, non-toxic appearing   SKIN: good skin turgor, no  obvious rashes  HEAD: normocephalic, atraumatic  EYES: sclera non-icteric bilaterally, conjunctiva clear bilaterally  OROPHARYNX: MMM, maintaining airway and secretions  NECK: no nuchal rigidity, no trismus, no edema, phonation normal    CARDIO: regular rate   LUNGS: no increased WOB  GI: abdomen soft and non-tender, no CVA tenderness bilaterally   EXTREMITIES: no cyanosis or edema, HALL without difficulty  NEURO: no focal deficits  PSYCH: alert and oriented x3, answering questions appropriately, mood appropriate    MDM/Assessment/Plan:   Orders for this encounter:    Orders Placed This Encounter    POCT Urinalysis Dipstick    Urine Culture, Routine     Order Specific Question:   Specimen source:     Answer:   Urine, clean catch     Order Specific Question:   Documentation of symptoms of UTI or sepsis:     Answer:   Documentation of symptoms of UTI or sepsis must be corroborated within the EMR     Order Specific Question:   Release to patient     Answer:   Immediate    POCT Urine Dip    cefadroxil 500 MG Oral Cap     Sig: Take 1 capsule (500 mg total) by mouth 2 (two) times daily for 7 days.     Dispense:  14 capsule     Refill:  0       Labs performed this visit:  Recent Results (from the past 10 hours)   POCT Urinalysis Dipstick    Collection Time: 12/26/24  9:39 AM   Result Value Ref Range    Urine Color Yellow Yellow    Urine Clarity Turbid (A) Clear    Specific Gravity, Urine 1.020 1.005 - 1.030    PH, Urine 7.0 5.0 - 8.0    Protein urine 100 (A) Negative mg/dL    Glucose, Urine Negative Negative mg/dL    Ketone, Urine Negative Negative mg/dL    Bilirubin, Urine Negative Negative    Blood, Urine Large (A) Negative    Nitrite Urine Positive (A) Negative    Urobilinogen urine <2.0 <2.0 mg/dL    Leukocyte esterase urine Large (A) Negative       Imaging performed this visit:  No orders to display       Medical Decision Making  DDx includes UTI versus interstitial cystitis versus nephrolithiasis versus other.   Patient is overall very well-appearing with stable vitals and tolerating oral intake.  No signs or symptoms of systemic illness.  No signs or symptoms of pyelonephritis.  Given patient's overall comfortable appearance and no flank pain, low suspicion for acute nephrolithiasis.  Urine dipstick analysis reveals turbid urine with proteinuria, large blood, positive nitrite, and large leukocyte esterase. Urine culture pending. These findings in the setting of patient's symptoms is concerning for an acute UTI.  Previous urine culture results were reviewed.  Rx cefadroxil given recent course of nitrofurantoin and cephalexin. Discussed supportive care including rest, increased fluid intake, and OTC Tylenol/Motrin as needed for pain. Instructed patient to go directly to nearest ER with any worsening or concerning symptoms. Follow up with PCP next week as scheduled and urology given recurrent UTIs.    Amount and/or Complexity of Data Reviewed  External Data Reviewed: labs.  Labs: ordered.    Risk  Prescription drug management.          Diagnosis:    ICD-10-CM    1. Acute urinary tract infection  N39.0 Urine Culture, Routine     Urine Culture, Routine          All results reviewed and discussed with patient/patient's family. Patient/patient's family verbalize excellent understanding of instructions and feels comfortable with plan. All of patient's/patient's family's questions were addressed.   See AVS for detailed discharge instructions for your condition today.    Follow Up with:  Audrey Retana MD  [facility] [address]  [facility] [address]  [phone]            Note: This document was dictated using Dragon medical dictation software.  Proofreading was performed to the best of my ability, but errors may be present.    Ellie Lamb PA-C

## 2024-12-26 NOTE — ED INITIAL ASSESSMENT (HPI)
Pt presents with \"foul smelling urine\" with urinary frequency and urgency in the am.     Pt reports 3 UTI within a one month period.

## 2024-12-26 NOTE — DISCHARGE INSTRUCTIONS
Complete entire course of antibiotic as directed   Urine culture results in 1-2 days   Drink plenty of water and get plenty of rest   Follow up with your primary care provider and urology     If you experience severe or worsening pain, fevers, chills, vomiting, flank pain, dizziness or weakness, or any other concerning symptoms, go to nearest ER immediately

## 2025-08-28 ENCOUNTER — HOSPITAL ENCOUNTER (OUTPATIENT)
Age: 85
Discharge: HOME | End: 2025-08-28

## 2025-08-28 VITALS
TEMPERATURE: 99 F | DIASTOLIC BLOOD PRESSURE: 85 MMHG | RESPIRATION RATE: 22 BRPM | HEART RATE: 79 BPM | OXYGEN SATURATION: 100 % | SYSTOLIC BLOOD PRESSURE: 179 MMHG

## 2025-08-28 DIAGNOSIS — Z20.822 ENCOUNTER FOR LABORATORY TESTING FOR COVID-19 VIRUS: ICD-10-CM

## 2025-08-28 DIAGNOSIS — Z20.822 LAB TEST NEGATIVE FOR COVID-19 VIRUS: ICD-10-CM

## 2025-08-28 DIAGNOSIS — B34.9 VIRAL ILLNESS: Primary | ICD-10-CM

## 2025-08-28 LAB
S PYO AG THROAT QL: NEGATIVE
SARS-COV-2 RNA RESP QL NAA+PROBE: NOT DETECTED

## 2025-08-28 PROCEDURE — 87880 STREP A ASSAY W/OPTIC: CPT | Performed by: NURSE PRACTITIONER

## 2025-08-28 PROCEDURE — U0002 COVID-19 LAB TEST NON-CDC: HCPCS | Performed by: NURSE PRACTITIONER

## 2025-08-28 PROCEDURE — 99213 OFFICE O/P EST LOW 20 MIN: CPT | Performed by: NURSE PRACTITIONER

## 2025-08-28 RX ORDER — EVOLOCUMAB 140 MG/ML
INJECTION, SOLUTION SUBCUTANEOUS
COMMUNITY

## 2025-08-28 RX ORDER — ZOLEDRONIC ACID 0.05 MG/ML
INJECTION, SOLUTION INTRAVENOUS
COMMUNITY